# Patient Record
Sex: FEMALE
[De-identification: names, ages, dates, MRNs, and addresses within clinical notes are randomized per-mention and may not be internally consistent; named-entity substitution may affect disease eponyms.]

---

## 2021-08-31 ENCOUNTER — HOSPITAL ENCOUNTER (EMERGENCY)
Dept: HOSPITAL 33 - ED | Age: 31
Discharge: HOME | End: 2021-08-31
Payer: SELF-PAY

## 2021-08-31 VITALS — SYSTOLIC BLOOD PRESSURE: 105 MMHG | DIASTOLIC BLOOD PRESSURE: 78 MMHG | HEART RATE: 95 BPM

## 2021-08-31 VITALS — OXYGEN SATURATION: 100 %

## 2021-08-31 DIAGNOSIS — K20.90: Primary | ICD-10-CM

## 2021-08-31 PROCEDURE — 99283 EMERGENCY DEPT VISIT LOW MDM: CPT

## 2021-08-31 PROCEDURE — 87651 STREP A DNA AMP PROBE: CPT

## 2021-08-31 NOTE — ERPHSYRPT
- History of Present Illness


Time Seen by Provider: 08/31/21 07:37


Source: patient


Exam Limitations: no limitations


Patient Subjective Stated Complaint: possible foreign body stuck in throat. "I 

woke up this morning feeling like there is something stuck here, like maybe I 

burped or tried to throw up in my sleep."


Triage Nursing Assessment: pt to ED c/o possible fb in throat. woke with this 

sensation this morning, denies pain, diff swallowing, breathing or talking. 

attempted to eat banana and drink fluids but sensation remained. pt also 

attempted to vomit to dislodge poss fb but she still feels the same. nothing 

visualized on examination.


Physician History: 





31 years old healthy male presented to the ER with scratchiness in the throat's,

mid esophagus upon waking up this morning.  Patient thinks she might have a 

foreign body in there as she might have tried to vomit while in the sleep.  She 

did eat her banana and is able to drink water and tolerated secretions.  She 

tried to vomit out which she was successful but still have the feeling.  No 

history of acid reflux.  Not in any distress.


Allergies/Adverse Reactions: 








No Known Drug Allergies Allergy (Unverified 08/31/21 07:22)


   





Hx Tetanus, Diphtheria Vaccination/Date Given: No


Hx Influenza Vaccination/Date Given: No


Hx Pneumococcal Vaccination/Date Given: No


Immunizations Up to Date: No





Travel Risk





- International Travel


Have you traveled outside of the country in past 3 weeks: No





- Coronavirus Screening


Are you exhibiting any of the following symptoms?: No


Close contact with a COVID-19 positive Pt in past 14-21 Days: No





- Vaccine Status


Have you recieved a Covid-19 vaccination: No





- Review of Systems


Constitutional: No Symptoms


Eyes: No Symptoms


Ears, Nose, & Throat: No Symptoms


Respiratory: No Symptoms


Cardiac: No Symptoms


Abdominal/Gastrointestinal: No Symptoms


Musculoskeletal: No Symptoms


Neurological: No Symptoms


Psychological: Anxiety


Endocrine: No Symptoms


Hematologic/Lymphatic: No Symptoms





- Past Medical History


Pertinent Past Medical History: No





- Past Surgical History


Past Surgical History: No





- Social History


Smoking Status: Never smoker


Exposure to second hand smoke: No


Drug Use: none


Patient Lives Alone: No





- Female History


Hx Pregnant Now: No





- Nursing Vital Signs


Nursing Vital Signs: 


                               Initial Vital Signs











Temperature  98.0 F   08/31/21 07:15


 


Pulse Rate  91 H  08/31/21 07:15


 


Respiratory Rate  18   08/31/21 07:15


 


Blood Pressure  102/84   08/31/21 07:15


 


O2 Sat by Pulse Oximetry  100   08/31/21 07:15








                                   Pain Scale











Pain Intensity                 0

















- Physical Exam


General Appearance: no apparent distress, alert


Eye Exam: PERRL/EOMI, eyes nml inspection


Ears, Nose, Throat Exam: TMs normal, pharyngeal erythema


Neck Exam: normal inspection, non-tender, supple, full range of motion


Respiratory Exam: normal breath sounds, lungs clear


Cardiovascular Exam: regular rate/rhythm, normal heart sounds


Gastrointestinal/Abdomen Exam: soft, normal bowel sounds, No tenderness


Extremity Exam: normal inspection


Neurologic Exam: alert, oriented x 3, cooperative


Skin Exam: normal color


**SpO2 Interpretation**: normal


SpO2: 100


O2 Delivery: Room Air


Ordered Tests: 


Medication Summary














Discontinued Medications














Generic Name Dose Route Start Last Admin





  Trade Name Freq  PRN Reason Stop Dose Admin


 


Al Hydrox/Mg Hydrox/Simethicone  Confirm  08/31/21 07:30 





  Maalox Es 30 Ml Unit Dose***  Administered  08/31/21 07:31 





  Dose  





  30 ml  





  .ROUTE  





  .STK-MED ONE  


 


Lidocaine HCl  Confirm  08/31/21 07:29 





  Xylocaine Hcl Viscous *  Administered  08/31/21 07:30 





  Dose  





  15 ml  





  .ROUTE  





  .STK-MED ONE  


 


Magnesium Hydroxide  45 ml  08/31/21 07:27  08/31/21 07:31





  Gi Cocktail 45 Ml (Maalox/Lidocaine)  PO  08/31/21 07:28  45 ml





  STAT ONE   Administration











Lab/Rad Data: 


                               Laboratory Results











  08/31/21 Range/Units





  07:45 


 


Group A Strep Antibody  NOT DETECTED  (NEGATIVE)  














- Progress


Progress: pain not gone completely, re-examined


Progress Note: 





08/31/21 07:38


I do not think she has food bolus but probably esophagitis and started on 

Protonix and Carafate, outpatient follow-up recommended.  Discussed signs 

symptoms of food bolus needing return to ER which she seems understanding.


Counseled pt/family regarding: diagnosis, need for follow-up





- Departure


Departure Disposition: Home


Clinical Impression: 


 Esophagitis





Condition: Stable


Critical Care Time: No


Referrals: 


DOCTOR,NO FAMILY [Primary Care Provider] - 


BEL MCCONNELL MD [ACTIVE STAFF] - Follow Up with PCP/3 days


Instructions:  Acid Reflux and GERD in Adults (DC)


Additional Instructions: 


take soft diet , tylenol as needed for pain, follow up with PCP for re 

evaluation . return to ER for worsening symptoms like no ability to swallow 

saliva, or drink liquids. 


Prescriptions: 


Sucralfate 1 gm*** [Carafate 1 GM***] 1 g PO ACHS #20 tablet


PANTOPRAZOLE 40 mg Tablet*** [Protonix 40MG Tablet***] 40 mg PO QAM #30 tab

## 2022-10-11 ENCOUNTER — HOSPITAL ENCOUNTER (EMERGENCY)
Dept: HOSPITAL 33 - ED | Age: 32
LOS: 1 days | Discharge: HOME | End: 2022-10-12
Payer: SELF-PAY

## 2022-10-11 DIAGNOSIS — R42: ICD-10-CM

## 2022-10-11 DIAGNOSIS — R51.9: Primary | ICD-10-CM

## 2022-10-11 DIAGNOSIS — F41.9: ICD-10-CM

## 2022-10-11 DIAGNOSIS — Z28.310: ICD-10-CM

## 2022-10-11 LAB
ALBUMIN SERPL-MCNC: 4.8 G/DL (ref 3.5–5)
ALP SERPL-CCNC: 88 U/L (ref 38–126)
ALT SERPL-CCNC: 56 U/L (ref 0–35)
AMPHETAMINES UR QL: NEGATIVE
ANION GAP SERPL CALC-SCNC: 14.7 MEQ/L (ref 5–15)
AST SERPL QL: 39 U/L (ref 14–36)
BARBITURATES UR QL: NEGATIVE
BASOPHILS # BLD AUTO: 0.06 X10^3/UL (ref 0–0.4)
BENZODIAZ UR QL SCN: NEGATIVE
BILIRUB BLD-MCNC: 0.5 MG/DL (ref 0.2–1.3)
BUN SERPL-MCNC: 9 MG/DL (ref 7–17)
CALCIUM SPEC-MCNC: 9.1 MG/DL (ref 8.4–10.2)
CHLORIDE SERPL-SCNC: 104 MMOL/L (ref 98–107)
CO2 SERPL-SCNC: 24 MMOL/L (ref 22–30)
COCAINE UR QL SCN: NEGATIVE
CREAT SERPL-MCNC: 0.52 MG/DL (ref 0.52–1.04)
EOSINOPHIL # BLD AUTO: 0.23 X10^3/UL (ref 0–0.5)
GFR SERPLBLD BASED ON 1.73 SQ M-ARVRAT: > 60 ML/MIN
GLUCOSE SERPL-MCNC: 110 MG/DL (ref 74–106)
GLUCOSE UR-MCNC: NEGATIVE MG/DL
HCT VFR BLD AUTO: 37.4 % (ref 35–47)
HGB BLD-MCNC: 12.3 G/DL (ref 12–16)
LYMPHOCYTES # SPEC AUTO: 2.39 X10^3/UL (ref 1–4.6)
MCH RBC QN AUTO: 29.7 PG (ref 26–32)
MCHC RBC AUTO-ENTMCNC: 32.9 G/DL (ref 32–36)
METHADONE UR QL: NEGATIVE
MONOCYTES # BLD AUTO: 0.65 X10^3/UL (ref 0–1.3)
OPIATES UR QL: NEGATIVE
PCP UR QL CFM>20 NG/ML: NEGATIVE
PLATELET # BLD AUTO: 377 X10^3/UL (ref 150–450)
POTASSIUM SERPLBLD-SCNC: 3.7 MMOL/L (ref 3.5–5.1)
PROT SERPL-MCNC: 8.4 G/DL (ref 6.3–8.2)
PROT UR STRIP-MCNC: NEGATIVE MG/DL
RBC # BLD AUTO: 4.14 X10^6/UL (ref 4.1–5.4)
RBC # UR AUTO: (no result) ERY/UL (ref 0–5)
RBC #/AREA URNS HPF: (no result) /HPF (ref 0–2)
SODIUM SERPL-SCNC: 139 MMOL/L (ref 137–145)
THC UR QL SCN: POSITIVE
UA DIPSTICK PNL UR: (no result)
URINE CULTURED INDICATED?: NO
WBC # BLD AUTO: 8.4 X10^3/UL (ref 4–10.5)
WBC #/AREA URNS HPF: (no result) /HPF (ref 0–5)

## 2022-10-11 PROCEDURE — 70450 CT HEAD/BRAIN W/O DYE: CPT

## 2022-10-11 PROCEDURE — 96374 THER/PROPH/DIAG INJ IV PUSH: CPT

## 2022-10-11 PROCEDURE — 36415 COLL VENOUS BLD VENIPUNCTURE: CPT

## 2022-10-11 PROCEDURE — 80307 DRUG TEST PRSMV CHEM ANLYZR: CPT

## 2022-10-11 PROCEDURE — 81015 MICROSCOPIC EXAM OF URINE: CPT

## 2022-10-11 PROCEDURE — 96360 HYDRATION IV INFUSION INIT: CPT

## 2022-10-11 PROCEDURE — 80053 COMPREHEN METABOLIC PANEL: CPT

## 2022-10-11 PROCEDURE — 36000 PLACE NEEDLE IN VEIN: CPT

## 2022-10-11 PROCEDURE — 84443 ASSAY THYROID STIM HORMONE: CPT

## 2022-10-11 PROCEDURE — 85652 RBC SED RATE AUTOMATED: CPT

## 2022-10-11 PROCEDURE — 85025 COMPLETE CBC W/AUTO DIFF WBC: CPT

## 2022-10-11 PROCEDURE — 83605 ASSAY OF LACTIC ACID: CPT

## 2022-10-11 PROCEDURE — 99284 EMERGENCY DEPT VISIT MOD MDM: CPT

## 2022-10-11 NOTE — ERPHSYRPT
- History of Present Illness


Time Seen by Provider: 10/11/22 19:55


Source: patient


Exam Limitations: language barrier (Slight)


Physician History: 





Patient is a 32-year-old female who complains of a headache on and off for some 

time.  She complains of being unable to sleep dizziness and screaming in her 

head.  This has been going on for some time.


Timing/Duration: week(s) (Several)


Quality: throbbing


Head Pain Location: global


Severity of Pain-Max: moderate


Severity of Pain-Current: mild


Recent Head Trauma: no recent headache/trauma, frequent headaches


Associated Symptoms: dizziness


Previous symptoms: no prior history


Allergies/Adverse Reactions: 








No Known Drug Allergies Allergy (Verified 10/11/22 19:53)


   





Hx Tetanus, Diphtheria Vaccination/Date Given: No


Hx Influenza Vaccination/Date Given: No


Hx Pneumococcal Vaccination/Date Given: No





Travel Risk





- Vaccine Status


Have you recieved a Covid-19 vaccination: No





- Review of Systems


Constitutional: No Fever, No Chills


Eyes: No Symptoms


Ears, Nose, & Throat: No Symptoms


Respiratory: No Cough, No Dyspnea


Cardiac: No Chest Pain, No Edema, No Syncope


Abdominal/Gastrointestinal: No Abdominal Pain, No Nausea, No Vomiting, No 

Diarrhea


Genitourinary Symptoms: No Dysuria


Musculoskeletal: No Back Pain, No Neck Pain


Skin: No Rash


Neurological: Headache, No Dizziness, No Focal Weakness, No Sensory Changes


Psychological: No Symptoms


Endocrine: No Symptoms


All Other Systems: Reviewed and Negative





- Past Medical History


Pertinent Past Medical History: No





- Past Surgical History


Past Surgical History: No





- Social History


Smoking Status: Never smoker


Exposure to second hand smoke: No


Drug Use: none


Patient Lives Alone: No





- Nursing Vital Signs


Nursing Vital Signs: 


                               Initial Vital Signs











Temperature  97.6 F   10/11/22 19:41


 


Pulse Rate  77   10/11/22 19:41


 


Respiratory Rate  18   10/11/22 19:41


 


Blood Pressure  130/84   10/11/22 19:41


 


O2 Sat by Pulse Oximetry  96   10/11/22 19:41








                                   Pain Scale











Pain Intensity                 0

















- Physical Exam


General Appearance: mild distress


Eye Exam: PERRL/EOMI


Ears, Nose, Throat Exam: normal ENT inspection, moist mucous membranes


Neck Exam: normal inspection, supple, full range of motion, No meningismus


Respiratory Exam: normal breath sounds, lungs clear


Cardiovascular Exam: regular rate/rhythm, normal heart sounds


Gastrointestinal/Abdominal Exam: soft, No tenderness, No distention


Back Exam: normal inspection, normal range of motion


Mental Status Exam: alert, oriented x 3, cooperative


CNs Exam: normal speech, PERRL, No facial droop


Coordination/Gait Exam: normal cerebellar function


Motor/Sensory Exam: no motor deficit, no sensory deficit


Skin Exam: normal color, warm, dry, No rash


**SpO2 Interpretation**: normal


SpO2: 96


O2 Delivery: Room Air





- Course


Nursing assessment & vital signs reviewed: Yes





- CT Exams


  ** Head


CT Interpretation: Tele-radiologist Report


Ordered Tests: 


                               Active Orders 24 hr











 Category Date Time Status


 


 IV Insertion STAT Care  10/11/22 19:49 Active


 


 HEAD WITHOUT CONTRAST [CT] Stat Exams  10/11/22 22:08 Taken


 


 CBC W DIFF Stat Lab  10/11/22 20:11 Completed


 


 CMP Stat Lab  10/11/22 20:11 Completed


 


 Erythrocyte Sedimentation Rate Stat Lab  10/11/22 20:11 Completed


 


 Lactic Acid AM.LAB Lab  10/12/22 04:00 Ordered


 


 TSH [TSH, 3RD Generation] Stat Lab  10/11/22 20:11 Completed


 


 UA W/RFX CULTURE Stat Lab  10/11/22 20:12 Completed


 


 Urine Triage Profile Stat Lab  10/11/22 20:12 Completed








Medication Summary














Discontinued Medications














Generic Name Dose Route Start Last Admin





  Trade Name Freq  PRN Reason Stop Dose Admin


 


Sodium Chloride  1,000 mls @ 999 mls/hr  10/11/22 19:49  10/11/22 21:13





  Sodium Chloride 0.9% 1000 Ml  IV  10/11/22 20:49  Infused





  .Q1H1M STA   Infusion


 


Sodium Chloride  Confirm  10/11/22 20:01 





  Sodium Chloride 0.9% 1000 Ml  Administered  10/11/22 20:02 





  Dose  





  1,000 mls @ ud  





  .ROUTE  





  .STK-MED ONE  


 


Ketorolac Tromethamine  30 mg  10/11/22 19:49  10/11/22 20:04





  Ketorolac Tromethamine 30 Mg/Ml Inj  IV  10/11/22 19:50  30 mg





  STAT ONE   Administration


 


Ketorolac Tromethamine  Confirm  10/11/22 20:01 





  Ketorolac Tromethamine 30 Mg/Ml Inj  Administered  10/11/22 20:02 





  Dose  





  30 mg  





  .ROUTE  





  .STK-MED ONE  


 


Lorazepam  1 mg  10/11/22 19:50  10/11/22 20:05





  Lorazepam 1 Mg Tablet  PO  10/11/22 19:51  1 mg





  STAT ONE   Administration


 


Lorazepam  Confirm  10/11/22 20:01 





  Lorazepam 1 Mg Tablet  Administered  10/11/22 20:02 





  Dose  





  1 mg  





  .ROUTE  





  .[facility]-MED ONE  











Lab/Rad Data: 


                           Laboratory Result Diagrams





                                 10/11/22 20:11 





                                 10/11/22 20:11 





                               Laboratory Results











  10/11/22 10/11/22 10/11/22 Range/Units





  20:12 20:12 20:11 


 


WBC     (4.0-10.5)  x10^3/uL


 


RBC     (4.1-5.4)  x10^6/uL


 


Hgb     (12.0-16.0)  g/dL


 


Hct     (35-47)  %


 


MCV     ()  fL


 


MCH     (26-32)  pg


 


MCHC     (32-36)  g/dL


 


RDW     (11.5-14.0)  %


 


Plt Count     (150-450)  x10^3/uL


 


MPV     (7.5-11.0)  fL


 


Gran %     (36.0-66.0)  %


 


Immature Gran % (Auto)     (0.00-0.4)  %


 


Nucleat RBC Rel Count     (0.00-0.1)  %


 


Eos # (Auto)     (0-0.5)  x10^3/uL


 


Immature Gran # (Auto)     (0.00-0.03)  x10^3u/L


 


Absolute Lymphs (auto)     (1.0-4.6)  x10^3/uL


 


Absolute Monos (auto)     (0.0-1.3)  x10^3/uL


 


Absolute Nucleated RBC     (0.00-0.01)  x10^3u/L


 


Lymphocytes %     (24.0-44.0)  %


 


Monocytes %     (0.0-12.0)  %


 


Eosinophils %     (0.00-5.0)  %


 


Basophils %     (0.0-0.4)  %


 


Absolute Granulocytes     (1.4-6.9)  x10^3/uL


 


Basophils #     (0-0.4)  x10^3/uL


 


ESR     (0-20)  mm/hr


 


Sodium     (137-145)  mmol/L


 


Potassium     (3.5-5.1)  mmol/L


 


Chloride     ()  mmol/L


 


Carbon Dioxide     (22-30)  mmol/L


 


Anion Gap     (5-15)  MEQ/L


 


BUN     (7-17)  mg/dL


 


Creatinine     (0.52-1.04)  mg/dL


 


Estimated GFR     ML/MIN


 


Glucose     ()  mg/dL


 


Calcium     (8.4-10.2)  mg/dL


 


Total Bilirubin     (0.2-1.3)  mg/dL


 


AST     (14-36)  U/L


 


ALT     (0-35)  U/L


 


Alkaline Phosphatase     ()  U/L


 


Serum Total Protein     (6.3-8.2)  g/dL


 


Albumin     (3.5-5.0)  g/dL


 


TSH 3rd Generation    2.090  (0.47-4.68)  mIU/L


 


Urinalys Dipstick Clnc  MAIN LAB    


 


Urine Color  YELLOW    (YELLOW)  


 


Urine Appearance  CLEAR    (CLEAR)  


 


Urine pH  6.5    (5-6)  


 


Ur Specific Gravity  >=1.030    (1.005-1.025)  


 


POC Urine Protein Conf  NEGATIVE    (Negative)  


 


Urine Ketones  NEGATIVE    (NEGATIVE)  


 


Urine Nitrite  NEGATIVE    (NEGATIVE)  


 


Urine Bilirubin  NEGATIVE    (NEGATIVE)  


 


Urine Urobilinogen  1    (0-1)  mg/dL


 


Urine Leukocytes  NEGATIVE    (NEGATIVE)  


 


Urine WBC (Auto)  0-2    (0-5)  /HPF


 


Urine RBC (Auto)  0-2    (0-2)  /HPF


 


U Epithel Cells (Auto)  RARE    (FEW)  /HPF


 


Urine Bacteria (Auto)  NONE    (NEGATIVE)  /HPF


 


Urine RBC  MODERATE    (0-5)  Cesar/ul


 


Urine Mucus (Auto)  SLIGHT    (NEGATIVE)  /HPF


 


Ur Culture Indicated?  NO    


 


Urine Glucose  NEGATIVE    (NEGATIVE)  mg/dL


 


Urine Opiates Level   NEGATIVE   (NEGATIVE)  


 


Ur Methadone   NEGATIVE   (NEGATIVE)  


 


Urine Barbiturates   NEGATIVE   (NEGATIVE)  


 


Ur Phencyclidine (PCP)   NEGATIVE   (NEGATIVE)  


 


Urine Amphetamine   NEGATIVE   (NEGATIVE)  


 


U Benzodiazepine Level   NEGATIVE   (NEGATIVE)  


 


Urine Cocaine   NEGATIVE   (NEGATIVE)  


 


Urine Marijuana (THC)   POSITIVE   (NEGATIVE)  














  10/11/22 10/11/22 Range/Units





  20:11 20:11 


 


WBC   8.4  (4.0-10.5)  x10^3/uL


 


RBC   4.14  (4.1-5.4)  x10^6/uL


 


Hgb   12.3  (12.0-16.0)  g/dL


 


Hct   37.4  (35-47)  %


 


MCV   90.3  ()  fL


 


MCH   29.7  (26-32)  pg


 


MCHC   32.9  (32-36)  g/dL


 


RDW   12.1  (11.5-14.0)  %


 


Plt Count   377  (150-450)  x10^3/uL


 


MPV   10.9  (7.5-11.0)  fL


 


Gran %   60.3  (36.0-66.0)  %


 


Immature Gran % (Auto)   0.2  (0.00-0.4)  %


 


Nucleat RBC Rel Count   0.0  (0.00-0.1)  %


 


Eos # (Auto)   0.23  (0-0.5)  x10^3/uL


 


Immature Gran # (Auto)   0.02  (0.00-0.03)  x10^3u/L


 


Absolute Lymphs (auto)   2.39  (1.0-4.6)  x10^3/uL


 


Absolute Monos (auto)   0.65  (0.0-1.3)  x10^3/uL


 


Absolute Nucleated RBC   0.00  (0.00-0.01)  x10^3u/L


 


Lymphocytes %   28.4  (24.0-44.0)  %


 


Monocytes %   7.7  (0.0-12.0)  %


 


Eosinophils %   2.7  (0.00-5.0)  %


 


Basophils %   0.7  (0.0-0.4)  %


 


Absolute Granulocytes   5.07  (1.4-6.9)  x10^3/uL


 


Basophils #   0.06  (0-0.4)  x10^3/uL


 


ESR   16  (0-20)  mm/hr


 


Sodium  139   (137-145)  mmol/L


 


Potassium  3.7   (3.5-5.1)  mmol/L


 


Chloride  104   ()  mmol/L


 


Carbon Dioxide  24   (22-30)  mmol/L


 


Anion Gap  14.7   (5-15)  MEQ/L


 


BUN  9   (7-17)  mg/dL


 


Creatinine  0.52   (0.52-1.04)  mg/dL


 


Estimated GFR  > 60.0   ML/MIN


 


Glucose  110 H   ()  mg/dL


 


Calcium  9.1   (8.4-10.2)  mg/dL


 


Total Bilirubin  0.50   (0.2-1.3)  mg/dL


 


AST  39 H   (14-36)  U/L


 


ALT  56 H   (0-35)  U/L


 


Alkaline Phosphatase  88   ()  U/L


 


Serum Total Protein  8.4 H   (6.3-8.2)  g/dL


 


Albumin  4.8   (3.5-5.0)  g/dL


 


TSH 3rd Generation    (0.47-4.68)  mIU/L


 


Urinalys Dipstick Clnc    


 


Urine Color    (YELLOW)  


 


Urine Appearance    (CLEAR)  


 


Urine pH    (5-6)  


 


Ur Specific Gravity    (1.005-1.025)  


 


POC Urine Protein Conf    (Negative)  


 


Urine Ketones    (NEGATIVE)  


 


Urine Nitrite    (NEGATIVE)  


 


Urine Bilirubin    (NEGATIVE)  


 


Urine Urobilinogen    (0-1)  mg/dL


 


Urine Leukocytes    (NEGATIVE)  


 


Urine WBC (Auto)    (0-5)  /HPF


 


Urine RBC (Auto)    (0-2)  /HPF


 


U Epithel Cells (Auto)    (FEW)  /HPF


 


Urine Bacteria (Auto)    (NEGATIVE)  /HPF


 


Urine RBC    (0-5)  Cesar/ul


 


Urine Mucus (Auto)    (NEGATIVE)  /HPF


 


Ur Culture Indicated?    


 


Urine Glucose    (NEGATIVE)  mg/dL


 


Urine Opiates Level    (NEGATIVE)  


 


Ur Methadone    (NEGATIVE)  


 


Urine Barbiturates    (NEGATIVE)  


 


Ur Phencyclidine (PCP)    (NEGATIVE)  


 


Urine Amphetamine    (NEGATIVE)  


 


U Benzodiazepine Level    (NEGATIVE)  


 


Urine Cocaine    (NEGATIVE)  


 


Urine Marijuana (THC)    (NEGATIVE)  














- Progress


Progress: improved


Air Movement: good


Blood Culture(s) Obtained: No


Antibiotics given: No





- Departure


Departure Disposition: Home


Clinical Impression: 


 Headache, Anxiety





Condition: Stable


Critical Care Time: No


Referrals: 


DOCTOR,NO FAMILY [Primary Care Provider] - Follow up/PCP as directed


Instructions:  Headache, Adult (DC)


Prescriptions: 


Lorazepam 1 mg*** [Ativan 1 MG***] 1 mg PO Q8H PRN PRN #8 tablet


 PRN Reason: Anxiety

## 2022-10-12 VITALS — HEART RATE: 71 BPM | DIASTOLIC BLOOD PRESSURE: 78 MMHG | SYSTOLIC BLOOD PRESSURE: 114 MMHG | OXYGEN SATURATION: 98 %

## 2022-10-12 NOTE — XRAY
Indication: Headache.  No known injury.



Multiple contiguous axial images obtained through the head without contrast.



Comparison: None



Normal appearing brain parenchyma, ventricles, and bony calvarium for

patient's age.  Visualized paranasal sinuses and mastoid air cells are clear.



Impression: Normal CT head without contrast exam.



Comment: Preliminary interpretation made by [facility].  No critical discrepancy.